# Patient Record
Sex: MALE | Race: WHITE | NOT HISPANIC OR LATINO | Employment: OTHER | URBAN - METROPOLITAN AREA
[De-identification: names, ages, dates, MRNs, and addresses within clinical notes are randomized per-mention and may not be internally consistent; named-entity substitution may affect disease eponyms.]

---

## 2021-08-11 PROBLEM — I10 ESSENTIAL HYPERTENSION: Status: ACTIVE | Noted: 2019-04-08

## 2021-08-11 PROBLEM — Z45.02 IMPLANTABLE CARDIOVERTER-DEFIBRILLATOR (ICD) GENERATOR END OF LIFE: Status: ACTIVE | Noted: 2021-03-04

## 2021-08-11 PROBLEM — I50.22 CHRONIC SYSTOLIC CONGESTIVE HEART FAILURE: Status: ACTIVE | Noted: 2019-04-08

## 2021-08-11 PROBLEM — I25.10 ATHEROSCLEROSIS OF NATIVE CORONARY ARTERY OF NATIVE HEART WITHOUT ANGINA PECTORIS: Status: ACTIVE | Noted: 2019-04-08

## 2021-08-11 PROBLEM — E78.5 HYPERLIPIDEMIA: Status: ACTIVE | Noted: 2019-04-08

## 2022-07-12 PROBLEM — Z85.46 HISTORY OF PROSTATE CANCER: Status: ACTIVE | Noted: 2022-07-12

## 2022-09-01 PROBLEM — E66.9 CLASS 2 OBESITY IN ADULT: Status: ACTIVE | Noted: 2022-09-01

## 2022-09-01 PROBLEM — Z95.5 HX OF HEART ARTERY STENT: Status: ACTIVE | Noted: 2022-09-01

## 2022-09-01 PROBLEM — E11.9 DM (DIABETES MELLITUS): Status: ACTIVE | Noted: 2022-09-01

## 2022-09-15 PROBLEM — K64.9 HEMORRHOIDS: Status: ACTIVE | Noted: 2022-09-15

## 2022-09-15 PROBLEM — K63.5 POLYP OF ASCENDING COLON: Status: ACTIVE | Noted: 2022-09-15

## 2022-09-15 PROBLEM — Z12.11 ENCOUNTER FOR SCREENING FOR MALIGNANT NEOPLASM OF COLON: Status: ACTIVE | Noted: 2022-09-15

## 2022-09-15 PROBLEM — K64.1 GRADE II HEMORRHOIDS: Status: ACTIVE | Noted: 2022-09-15

## 2022-09-30 PROBLEM — K21.9 CHRONIC GERD: Status: ACTIVE | Noted: 2022-09-30

## 2022-09-30 PROBLEM — F17.209 TOBACCO USE DISORDER, CONTINUOUS: Status: ACTIVE | Noted: 2022-09-30

## 2022-09-30 PROBLEM — K44.9 HIATAL HERNIA: Status: ACTIVE | Noted: 2022-09-30

## 2022-11-29 ENCOUNTER — PATIENT OUTREACH (OUTPATIENT)
Dept: ADMINISTRATIVE | Facility: HOSPITAL | Age: 67
End: 2022-11-29

## 2022-11-29 NOTE — PROGRESS NOTES
[facility] CMS chart audits/DIABETES-Hemoglobin A1C. Chart review completed for HM test overdue (mammograms, Colonoscopies, pap smears, DM labs, and/or EYE EXAMs)       Care Everywhere and media, updates requested and reviewed.     [facility] and [facility] reviewed.      No record of hemoglobin a1c.

## 2023-05-10 PROBLEM — G89.4 CHRONIC PAIN DISORDER: Status: ACTIVE | Noted: 2023-05-10

## 2023-05-10 PROBLEM — M47.816 LUMBAR SPONDYLOSIS: Status: ACTIVE | Noted: 2023-05-10

## 2023-11-27 PROBLEM — E11.9 DM (DIABETES MELLITUS): Status: RESOLVED | Noted: 2022-09-01 | Resolved: 2023-11-27

## 2023-11-27 PROBLEM — R73.03 PREDIABETES: Status: ACTIVE | Noted: 2023-11-27
